# Patient Record
Sex: FEMALE | Race: BLACK OR AFRICAN AMERICAN | URBAN - METROPOLITAN AREA
[De-identification: names, ages, dates, MRNs, and addresses within clinical notes are randomized per-mention and may not be internally consistent; named-entity substitution may affect disease eponyms.]

---

## 2021-10-06 ENCOUNTER — OFFICE VISIT (OUTPATIENT)
Dept: URBAN - METROPOLITAN AREA CLINIC 58 | Facility: CLINIC | Age: 71
End: 2021-10-06
Payer: MEDICARE

## 2021-10-06 DIAGNOSIS — H25.13 AGE-RELATED NUCLEAR CATARACT, BILATERAL: Primary | ICD-10-CM

## 2021-10-06 PROCEDURE — 99204 OFFICE O/P NEW MOD 45 MIN: CPT | Performed by: OPTOMETRIST

## 2021-10-06 ASSESSMENT — KERATOMETRY
OD: 45.88
OS: 45.25

## 2021-10-06 ASSESSMENT — VISUAL ACUITY
OD: 20/400
OS: 20/50

## 2021-10-06 ASSESSMENT — INTRAOCULAR PRESSURE
OD: 19
OS: 19

## 2021-10-06 NOTE — IMPRESSION/PLAN
Impression: Type 2 diabetes mellitus w/o complication: F83.3. Plan: No obvious diabetic retinopathy. Further eval after CEIOL.

## 2021-10-06 NOTE — IMPRESSION/PLAN
Impression: Age-related nuclear cataract, bilateral: H25.13. Plan: Discussed diagnosis in detail. I recommended cataract surgery. Consult with Dr. Tahira Sidhu for pre-op for cataract surgery.

## 2021-11-15 ENCOUNTER — OFFICE VISIT (OUTPATIENT)
Dept: URBAN - METROPOLITAN AREA CLINIC 58 | Facility: CLINIC | Age: 71
End: 2021-11-15
Payer: MEDICARE

## 2021-11-15 DIAGNOSIS — E11.9 TYPE 2 DIABETES MELLITUS W/O COMPLICATION: ICD-10-CM

## 2021-11-15 PROCEDURE — 99204 OFFICE O/P NEW MOD 45 MIN: CPT | Performed by: OPHTHALMOLOGY

## 2021-11-15 ASSESSMENT — KERATOMETRY
OD: 45.75
OS: 45.13

## 2021-11-15 ASSESSMENT — INTRAOCULAR PRESSURE
OD: 21
OS: 17

## 2021-11-15 ASSESSMENT — VISUAL ACUITY
OS: 20/400
OD: 20/200

## 2021-11-15 NOTE — IMPRESSION/PLAN
Impression: Type 2 diabetes mellitus w/o complication: K69.9. Plan: No obvious diabetic retinopathy. Further eval after CEIOL.

## 2021-12-08 ENCOUNTER — TESTING ONLY (OUTPATIENT)
Dept: URBAN - METROPOLITAN AREA CLINIC 58 | Facility: CLINIC | Age: 71
End: 2021-12-08
Payer: MEDICARE

## 2021-12-08 PROCEDURE — W9997 IOL SELECTION: HCPCS | Performed by: OPHTHALMOLOGY

## 2021-12-08 ASSESSMENT — PACHYMETRY
OD: 2.80
OD: 23.19
OS: 3.16
OS: 23.21

## 2021-12-20 ENCOUNTER — SURGERY (OUTPATIENT)
Dept: URBAN - METROPOLITAN AREA SURGERY 32 | Facility: SURGERY | Age: 71
End: 2021-12-20
Payer: MEDICARE

## 2021-12-20 DIAGNOSIS — H57.03 MIOSIS: Primary | ICD-10-CM

## 2021-12-20 PROCEDURE — 66982 XCAPSL CTRC RMVL CPLX WO ECP: CPT | Performed by: OPHTHALMOLOGY

## 2021-12-20 RX ORDER — ACETAZOLAMIDE 250 MG/1
250 MG TABLET ORAL
Qty: 2 | Refills: 0 | Status: ACTIVE
Start: 2021-12-20

## 2021-12-21 ENCOUNTER — POST-OPERATIVE VISIT (OUTPATIENT)
Dept: URBAN - METROPOLITAN AREA CLINIC 58 | Facility: CLINIC | Age: 71
End: 2021-12-21
Payer: MEDICARE

## 2021-12-21 DIAGNOSIS — Z48.810 ENCOUNTER FOR SURGICAL AFTERCARE FOLLOWING SURGERY ON A SENSE ORGAN: Primary | ICD-10-CM

## 2021-12-21 PROCEDURE — 99024 POSTOP FOLLOW-UP VISIT: CPT | Performed by: OPTOMETRIST

## 2021-12-21 ASSESSMENT — INTRAOCULAR PRESSURE
OD: 20
OS: 20

## 2021-12-29 ENCOUNTER — POST-OPERATIVE VISIT (OUTPATIENT)
Dept: URBAN - METROPOLITAN AREA CLINIC 58 | Facility: CLINIC | Age: 71
End: 2021-12-29
Payer: MEDICARE

## 2021-12-29 PROCEDURE — 99024 POSTOP FOLLOW-UP VISIT: CPT | Performed by: OPTOMETRIST

## 2021-12-29 ASSESSMENT — INTRAOCULAR PRESSURE
OD: 18
OS: 18

## 2021-12-29 NOTE — IMPRESSION/PLAN
Impression: S/P Cataract Extraction by phacoemulsification with IOL placement, Dexycu OD - 9 Days. Encounter for surgical aftercare following surgery on a sense organ  Z48.810. Excellent post op course   Condition is improving - Plan: Schedule 2nd eye cat sx, disability due to blur.

## 2022-01-03 ENCOUNTER — SURGERY (OUTPATIENT)
Dept: URBAN - METROPOLITAN AREA SURGERY 32 | Facility: SURGERY | Age: 72
End: 2022-01-03
Payer: MEDICARE

## 2022-01-03 DIAGNOSIS — H25.12 AGE-RELATED NUCLEAR CATARACT, LEFT EYE: Primary | ICD-10-CM

## 2022-01-03 PROCEDURE — 66982 XCAPSL CTRC RMVL CPLX WO ECP: CPT | Performed by: OPHTHALMOLOGY

## 2022-01-04 ENCOUNTER — POST-OPERATIVE VISIT (OUTPATIENT)
Dept: URBAN - METROPOLITAN AREA CLINIC 58 | Facility: CLINIC | Age: 72
End: 2022-01-04
Payer: MEDICARE

## 2022-01-04 PROCEDURE — 99024 POSTOP FOLLOW-UP VISIT: CPT | Performed by: OPTOMETRIST

## 2022-01-04 ASSESSMENT — INTRAOCULAR PRESSURE
OS: 26
OD: 25

## 2022-01-13 ENCOUNTER — POST-OPERATIVE VISIT (OUTPATIENT)
Dept: URBAN - METROPOLITAN AREA CLINIC 58 | Facility: CLINIC | Age: 72
End: 2022-01-13
Payer: MEDICARE

## 2022-01-13 DIAGNOSIS — Z96.1 PRESENCE OF INTRAOCULAR LENS: Primary | ICD-10-CM

## 2022-01-13 PROCEDURE — 99024 POSTOP FOLLOW-UP VISIT: CPT | Performed by: OPTOMETRIST

## 2022-01-13 ASSESSMENT — INTRAOCULAR PRESSURE
OS: 22
OD: 22

## 2022-01-13 ASSESSMENT — VISUAL ACUITY
OS: 20/20 -1
OD: 20/50 -1

## 2022-01-13 NOTE — IMPRESSION/PLAN
Impression: S/P Cataract Extraction by phacoemulsification with IOL placement with Dexycu OS - 10 Days. Presence of intraocular lens  Z96.1.  Excellent post op course   Condition is improving - Plan: 3 week PO3

## 2022-01-31 ENCOUNTER — POST-OPERATIVE VISIT (OUTPATIENT)
Dept: URBAN - METROPOLITAN AREA CLINIC 58 | Facility: CLINIC | Age: 72
End: 2022-01-31
Payer: MEDICARE

## 2022-01-31 PROCEDURE — 99024 POSTOP FOLLOW-UP VISIT: CPT | Performed by: OPTOMETRIST

## 2022-01-31 ASSESSMENT — VISUAL ACUITY
OS: 20/20
OD: 20/25

## 2022-01-31 ASSESSMENT — INTRAOCULAR PRESSURE
OD: 18
OS: 18

## 2022-01-31 NOTE — IMPRESSION/PLAN
Impression: S/P Cataract Extraction by phacoemulsification with IOL placement with Dexycu OS - 28 Days. Presence of intraocular lens  Z96.1.  Excellent post op course   Condition is improving - Plan: 3 months DE

## 2022-04-28 ENCOUNTER — OFFICE VISIT (OUTPATIENT)
Dept: URBAN - METROPOLITAN AREA CLINIC 58 | Facility: CLINIC | Age: 72
End: 2022-04-28
Payer: MEDICARE

## 2022-04-28 DIAGNOSIS — E11.9 TYPE 2 DIABETES MELLITUS W/O COMPLICATION: Primary | ICD-10-CM

## 2022-04-28 DIAGNOSIS — H52.11 MYOPIA, RIGHT EYE: ICD-10-CM

## 2022-04-28 PROCEDURE — 92014 COMPRE OPH EXAM EST PT 1/>: CPT | Performed by: OPTOMETRIST

## 2022-04-28 ASSESSMENT — INTRAOCULAR PRESSURE
OD: 21
OS: 17

## 2022-04-28 ASSESSMENT — VISUAL ACUITY
OS: 20/20
OD: 20/20

## 2022-04-28 NOTE — IMPRESSION/PLAN
Impression: Type 2 diabetes mellitus w/o complication: E76.7. Plan: Diabetes type II: no background retinopathy, no signs of neovascularization noted. Discussed ocular and systemic benefits of blood sugar control.